# Patient Record
Sex: MALE | Race: WHITE
[De-identification: names, ages, dates, MRNs, and addresses within clinical notes are randomized per-mention and may not be internally consistent; named-entity substitution may affect disease eponyms.]

---

## 2019-07-10 ENCOUNTER — HOSPITAL ENCOUNTER (EMERGENCY)
Dept: HOSPITAL 60 - LB.ED | Age: 34
Discharge: HOME | End: 2019-07-10
Payer: COMMERCIAL

## 2019-07-10 DIAGNOSIS — B34.9: Primary | ICD-10-CM

## 2019-07-11 NOTE — ER
HISTORY OF PRESENT ILLNESS:  A 34-year-old male here with complaints of not feeling well for

the last few days.  He has been exhausted.  At times, he feels chilled and has been sweaty.

Other times, lightheaded.  He does not have any problems with headache, but just feels

somewhat lightheaded.  He has had just an occasional cough that is rare.  The patient tells

me he denies any problems with shortness of breath, wheezing, chest pain or abdominal pain.

 

CURRENT MEDS:  None.

 

PAST MEDICAL HISTORY:  Healthy.

 

OBJECTIVE:  GENERAL APPEARANCE:  The patient is awake and alert.  No obvious distress.

VITAL SIGNS:  Reviewed. Blood pressure initially 150/88, pulse is 112, temp is 99.8,

respirations are 16, O2 sats 96% on room air. HEENT:  Eyes; pupils equal, round, and

reactive to light.  EOMs are intact.  Ears; TMs are normal.  Nares are patent.  Oral mucous

membranes moist.  Tonsils not enlarged or injected.  Pharynx not inflamed.  NECK:  Supple.

LUNGS:  Clear.  CARDIAC:  Heart sounds distinct.  S1, S2 present.  Regular rate.  No

murmurs. ABDOMEN:  Soft, nontender to palpation.  Bowel sounds are present.  SKIN:  Warm and

dry.

 

LABORATORY DATA:  Tonight include a CBC showing a normal white count.  Monos are slightly

elevated.  Comprehensive metabolic panel shows normal kidney function.  Blood glucose

nonfasting is 168.  AST is just slightly elevated at 40.  Albumin is low at 2.4.

Orthostatic blood pressure and pulse readings were obtained with only slight changes.

Hallpike maneuver is negative.

 

DIAGNOSIS:  Viral illness with what appears to be some fatigue issues.

 

TREATMENT PLAN:  I questioned the patient about his work.  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  He tells me that they work 12-16 hour a day and they

only get 1 day off every 15 days.  Every other Sunday they get off.  I advised the patient

that he needs to take a little break, at least a day or two of rest, push fluids, and use

over-the-counter medications as needed.  If he does not feel better, he should come in for a

recheck in a couple of days or he should come in if his symptoms should get worse.  The

patient states he plans on going home for a longer break in about a week.  I advised the

patient that he needs to follow up back home with his primary care provider and have lab

work repeated, getting a workup for diabetes as part of that workup.  The patient agrees

with the

treatment plan and has no further questions.  I also did bring up the possibility of doing a

head CT, but the patient declined that tonight.

 

 

KARINA/NADIA

DD:  07/10/2019 20:45:01

DT:  07/10/2019 21:14:04

Job #:  [id number]